# Patient Record
Sex: MALE
[De-identification: names, ages, dates, MRNs, and addresses within clinical notes are randomized per-mention and may not be internally consistent; named-entity substitution may affect disease eponyms.]

---

## 2018-09-07 PROBLEM — Z00.00 ENCOUNTER FOR PREVENTIVE HEALTH EXAMINATION: Status: ACTIVE | Noted: 2018-09-07

## 2018-09-13 ENCOUNTER — APPOINTMENT (OUTPATIENT)
Dept: RHEUMATOLOGY | Facility: CLINIC | Age: 48
End: 2018-09-13

## 2019-05-01 ENCOUNTER — APPOINTMENT (OUTPATIENT)
Dept: RHEUMATOLOGY | Facility: CLINIC | Age: 49
End: 2019-05-01
Payer: COMMERCIAL

## 2019-05-01 ENCOUNTER — LABORATORY RESULT (OUTPATIENT)
Age: 49
End: 2019-05-01

## 2019-05-01 VITALS
DIASTOLIC BLOOD PRESSURE: 83 MMHG | OXYGEN SATURATION: 96 % | RESPIRATION RATE: 16 BRPM | HEART RATE: 77 BPM | HEIGHT: 68 IN | SYSTOLIC BLOOD PRESSURE: 131 MMHG | WEIGHT: 192 LBS | BODY MASS INDEX: 29.1 KG/M2 | TEMPERATURE: 97.5 F

## 2019-05-01 DIAGNOSIS — Z82.49 FAMILY HISTORY OF ISCHEMIC HEART DISEASE AND OTHER DISEASES OF THE CIRCULATORY SYSTEM: ICD-10-CM

## 2019-05-01 DIAGNOSIS — Z78.9 OTHER SPECIFIED HEALTH STATUS: ICD-10-CM

## 2019-05-01 DIAGNOSIS — Z83.3 FAMILY HISTORY OF DIABETES MELLITUS: ICD-10-CM

## 2019-05-01 PROCEDURE — 99205 OFFICE O/P NEW HI 60 MIN: CPT

## 2019-05-02 LAB
CCP AB SER IA-ACNC: <8 UNITS
CK SERPL-CCNC: 1696 U/L
CRP SERPL-MCNC: 0.16 MG/DL
DSDNA AB SER-ACNC: <12 IU/ML
ENA RNP AB SER IA-ACNC: <0.2 AL
ENA SM AB SER IA-ACNC: <0.2 AL
ENA SS-A AB SER IA-ACNC: >8 AL
ENA SS-B AB SER IA-ACNC: <0.2 AL
ERYTHROCYTE [SEDIMENTATION RATE] IN BLOOD BY WESTERGREN METHOD: 13 MM/HR
HAV IGM SER QL: NONREACTIVE
HBV CORE IGM SER QL: NONREACTIVE
HBV SURFACE AG SER QL: NONREACTIVE
HCV AB SER QL: NONREACTIVE
HCV S/CO RATIO: 0.14 S/CO
RF+CCP IGG SER-IMP: NEGATIVE
RHEUMATOID FACT SER QL: 41 IU/ML
TSH SERPL-ACNC: 3.24 UIU/ML
URATE SERPL-MCNC: 7.3 MG/DL

## 2019-05-03 PROBLEM — Z82.49 FAMILY HISTORY OF HYPERTENSION: Status: ACTIVE | Noted: 2019-05-01

## 2019-05-03 PROBLEM — Z78.9 SOCIAL ALCOHOL USE: Status: ACTIVE | Noted: 2019-05-01

## 2019-05-03 PROBLEM — Z78.9 NON-SMOKER: Status: ACTIVE | Noted: 2019-05-01

## 2019-05-03 PROBLEM — Z83.3 FAMILY HISTORY OF DIABETES MELLITUS: Status: ACTIVE | Noted: 2019-05-01

## 2019-05-03 LAB
25(OH)D3 SERPL-MCNC: 22.9 NG/ML
ANA PAT FLD IF-IMP: ABNORMAL
ANA SER IF-ACNC: ABNORMAL
SMOOTH MUSCLE AB SER QL IF: NORMAL

## 2019-05-03 NOTE — DATA REVIEWED
[FreeTextEntry1] : Blood tests reviewed from March 2019:\par Creatinine 0.81\par AST of 55 normal limits between 10/35\par ALT 59  nL 9/46\par White count 5.5\par Hemoglobin 15.7\par Platelet count 227\par A 2-D echocardiogram from March 2019:\par LV and normal size and function with an LVEF of 55% \par All 4 valves normal.\par Carotid Doppler [id number]\par No stenosis or evidence of tortuosity of the vessels\par Ultrasound 2019 of March:\par Cholesterol stone or polyp of the gallbladder with right renal cyst

## 2019-05-03 NOTE — ASSESSMENT
[FreeTextEntry1] : Patient with rash , myalgias after HCQ use:\par \par Previous testing from outside providers reviewed with patient in full.  Patient to have inflammatory markers and serologies drawn to assess for an underlying inflammatory process lending to presentation in the setting of myalgias and joint pain.  Hand films ordered.  Patient will benefit from physical therapy to help with joint mobility and muscle strengthening.  Quadriceps strengthening exercises demonstrated in the office.  Weight loss has been encouraged to reduce load over the medial joint line.  Viscosupplementation has been encouraged to provide additional lubrication and joint support.  In the interim, Diclofenac gel has been prescribed. Knee films ordered. \par He is in agreement with the above plan and will return in one months' time.\par \par \par \par

## 2019-05-03 NOTE — REVIEW OF SYSTEMS
[Arthralgias] : arthralgias [Skin Lesions] : skin lesion [Anxiety] : anxiety [Chills] : no chills [Eye Pain] : no eye pain [Fever] : no fever [Red Eyes] : eyes not red [Sore Throat] : no sore throat [Hoarseness] : no hoarseness [Leg Claudication] : no intermittent leg claudication [Lower Ext Edema] : no extremity edema [Cough] : no cough [SOB on Exertion] : no shortness of breath during exertion [Joint Pain] : no joint pain [Heartburn] : no heartburn [Joint Swelling] : no joint swelling [Joint Stiffness] : no joint stiffness [Limb Weakness] : no limb weakness [Depression] : no depression [Difficulty Walking] : no difficulty walking [Easy Bleeding] : no tendency for easy bleeding [Feelings Of Weakness] : no feelings of weakness [Muscle Weakness] : no muscle weakness [Easy Bruising] : no tendency for easy bruising

## 2019-05-03 NOTE — HISTORY OF PRESENT ILLNESS
[Skin Lesions] : skin lesions [Arthralgias] : arthralgias [Weight Loss] : no weight loss [FreeTextEntry1] : The patient presents with lower extremity weakness over the last few months. He explains having started Hydroxychloroquine at the recommendation by a prior rheumatologist based off of lab testing and symptoms including rash over the chest wall  triggered by sunlight.  He reports discontinuing HCQ secondary to weakness.  He explains intermittent knee pain without associated swelling, paresthesias or trauma.   He reports in general, feeling nervous.  He finds he feels well overall and does not understand the reasoning behind blood testing and medication use.    He otherwise denies Raynaud's or accompanied joint swelling. \par He further denies visual disturbances, oral ulcers, dyspnea, chest pain, motor or sensory disturbances or fevers. [Chills] : no chills [Malaise] : no malaise [Fever] : no fever [Oral Ulcers] : no oral ulcers [Cough] : no cough [Depression] : no depression [Chest Pain] : no chest pain [Dry Mouth] : no dry mouth [Shortness of Breath] : no shortness of breath [Joint Swelling] : no joint swelling [Joint Warmth] : no joint warmth [Decreased ROM] : no decreased range of motion [Joint Deformity] : no joint deformity [Morning Stiffness] : no morning stiffness [Difficulty Walking] : no difficulty walking [Difficulty Standing] : no difficulty standing [Falls] : no falls [Muscle Spasms] : no muscle spasms [Dyspnea] : no dyspnea [Myalgias] : no myalgias [Muscle Weakness] : no muscle weakness [Visual Changes] : no visual changes [Muscle Cramping] : no muscle cramping [Eye Pain] : no eye pain [Dry Eyes] : no dry eyes [Eye Redness] : no eye redness

## 2019-05-07 LAB
M TB IFN-G BLD-IMP: NEGATIVE
QUANTIFERON TB PLUS MITOGEN MINUS NIL: >10 IU/ML
QUANTIFERON TB PLUS NIL: 0.05 IU/ML
QUANTIFERON TB PLUS TB1 MINUS NIL: 0.01 IU/ML
QUANTIFERON TB PLUS TB2 MINUS NIL: 0 IU/ML

## 2019-05-08 ENCOUNTER — APPOINTMENT (OUTPATIENT)
Dept: RHEUMATOLOGY | Facility: CLINIC | Age: 49
End: 2019-05-08
Payer: COMMERCIAL

## 2019-05-08 VITALS
OXYGEN SATURATION: 97 % | RESPIRATION RATE: 16 BRPM | BODY MASS INDEX: 28.79 KG/M2 | TEMPERATURE: 98.6 F | DIASTOLIC BLOOD PRESSURE: 80 MMHG | HEIGHT: 68 IN | WEIGHT: 190 LBS | SYSTOLIC BLOOD PRESSURE: 126 MMHG | HEART RATE: 77 BPM

## 2019-05-08 PROCEDURE — 99214 OFFICE O/P EST MOD 30 MIN: CPT

## 2019-05-10 RX ORDER — HYDROXYCHLOROQUINE SULFATE 200 MG/1
200 TABLET, FILM COATED ORAL
Refills: 0 | Status: ACTIVE | COMMUNITY

## 2019-05-10 NOTE — HISTORY OF PRESENT ILLNESS
[FreeTextEntry1] : Patient returns for followup blood testing results reflecting LUCINA of 1:80 in a speckled pattern with Ro+ positivity. Patient with elevated CPKs as well. She reports sleep disturbance secondary to low mood and recent symptomatology. He explains continued myalgias and fatigue and denies prior statin or supplement use.  He otherwise denies any new systemic symptoms.

## 2019-05-10 NOTE — REVIEW OF SYSTEMS
[Arthralgias] : arthralgias [Skin Lesions] : skin lesion [Anxiety] : anxiety [Chills] : no chills [Fever] : no fever [Eye Pain] : no eye pain [Red Eyes] : eyes not red [Sore Throat] : no sore throat [Hoarseness] : no hoarseness [Leg Claudication] : no intermittent leg claudication [Lower Ext Edema] : no extremity edema [Cough] : no cough [SOB on Exertion] : no shortness of breath during exertion [Heartburn] : no heartburn [Joint Pain] : no joint pain [Joint Swelling] : no joint swelling [Joint Stiffness] : no joint stiffness [Limb Weakness] : no limb weakness [Difficulty Walking] : no difficulty walking [Depression] : no depression [Muscle Weakness] : no muscle weakness [Feelings Of Weakness] : no feelings of weakness [Easy Bleeding] : no tendency for easy bleeding [Easy Bruising] : no tendency for easy bruising

## 2019-05-10 NOTE — PHYSICAL EXAM
[General Appearance - Alert] : alert [Sclera] : the sclera and conjunctiva were normal [General Appearance - In No Acute Distress] : in no acute distress [Examination Of The Oral Cavity] : the lips and gums were normal [Oropharynx] : the oropharynx was normal [Neck Appearance] : the appearance of the neck was normal [] : the neck was supple [Auscultation Breath Sounds / Voice Sounds] : lungs were clear to auscultation bilaterally [Heart Rate And Rhythm] : heart rate was normal and rhythm regular [Edema] : there was no peripheral edema [Nail Clubbing] : no clubbing  or cyanosis of the fingernails [Abnormal Walk] : normal gait [No Spinal Tenderness] : no spinal tenderness [Musculoskeletal - Swelling] : no joint swelling seen [Motor Tone] : muscle strength and tone were normal [Motor Exam] : the motor exam was normal [Impaired Insight] : insight and judgment were intact [Oriented To Time, Place, And Person] : oriented to person, place, and time [FreeTextEntry1] : Quarter size erythematous macule over the left chest wall

## 2019-05-10 NOTE — PHYSICAL EXAM
[General Appearance - Alert] : alert [General Appearance - In No Acute Distress] : in no acute distress [Sclera] : the sclera and conjunctiva were normal [Examination Of The Oral Cavity] : the lips and gums were normal [Oropharynx] : the oropharynx was normal [Neck Appearance] : the appearance of the neck was normal [] : the neck was supple [Auscultation Breath Sounds / Voice Sounds] : lungs were clear to auscultation bilaterally [Heart Rate And Rhythm] : heart rate was normal and rhythm regular [Edema] : there was no peripheral edema [Abnormal Walk] : normal gait [No Spinal Tenderness] : no spinal tenderness [Nail Clubbing] : no clubbing  or cyanosis of the fingernails [Musculoskeletal - Swelling] : no joint swelling seen [Motor Tone] : muscle strength and tone were normal [Motor Exam] : the motor exam was normal [Oriented To Time, Place, And Person] : oriented to person, place, and time [Impaired Insight] : insight and judgment were intact [FreeTextEntry1] : Quarter size erythematous macule over the left chest wall

## 2019-05-10 NOTE — ASSESSMENT
[FreeTextEntry1] : Patient with CTD/Overlap syndrome LUCINA + Ro+ +CPK:\par \par Will initiate disease modifying agent at this time to help prevent further progression of symptomatology.  Patient understands the increase risk of  infections as well as the increase risk of bone marrow, liver and lung toxicity associated with MTX.  The patient understands the importance of monitoring for drug toxicities every three months. In the interim steroid therapy given to help assist with myalgias and fatigue as reflected by  elevated CPKs.  He is aware of the harmful long term effects of chronic steroid use, including the development of DM, HTN, Osteoporosis and ocular defects including Cataracts and Glaucoma.\par Patient understands the increased risk of cardiovascular events related to CTD.  He understands the importance of sun avoidance and the application of SPF protection as well as the use of wide brim hats.\par Patient will benefit from physical therapy to help with joint mobility and muscle strengthening.  Quadriceps strengthening exercises rec. Weight loss has been encouraged to reduce load over the medial joint line.  Viscosupplementation has been encouraged to provide additional lubrication and joint support. \par \par He is in agreement with the above plan and will return in one months' time.\par \par \par \par

## 2019-06-13 LAB — MYOMARKER PANEL 1: NORMAL

## 2019-06-18 LAB
ALBUMIN SERPL ELPH-MCNC: 4.4 G/DL
ALP BLD-CCNC: 45 U/L
ALT SERPL-CCNC: 55 U/L
ANION GAP SERPL CALC-SCNC: 11 MMOL/L
AST SERPL-CCNC: 54 U/L
BASOPHILS # BLD AUTO: 0.04 K/UL
BASOPHILS NFR BLD AUTO: 0.6 %
BILIRUB SERPL-MCNC: 1 MG/DL
BUN SERPL-MCNC: 17 MG/DL
CALCIUM SERPL-MCNC: 9.9 MG/DL
CHLORIDE SERPL-SCNC: 103 MMOL/L
CK SERPL-CCNC: 1161 U/L
CO2 SERPL-SCNC: 28 MMOL/L
CREAT SERPL-MCNC: 0.82 MG/DL
CRP SERPL-MCNC: <0.1 MG/DL
EOSINOPHIL # BLD AUTO: 0.16 K/UL
EOSINOPHIL NFR BLD AUTO: 2.2 %
ERYTHROCYTE [SEDIMENTATION RATE] IN BLOOD BY WESTERGREN METHOD: 7 MM/HR
GLUCOSE SERPL-MCNC: 106 MG/DL
HCT VFR BLD CALC: 45.8 %
HGB BLD-MCNC: 15 G/DL
IMM GRANULOCYTES NFR BLD AUTO: 0.3 %
LDH SERPL-CCNC: 325 U/L
LYMPHOCYTES # BLD AUTO: 3.25 K/UL
LYMPHOCYTES NFR BLD AUTO: 44.7 %
MAN DIFF?: NORMAL
MCHC RBC-ENTMCNC: 30.9 PG
MCHC RBC-ENTMCNC: 32.8 GM/DL
MCV RBC AUTO: 94.2 FL
MONOCYTES # BLD AUTO: 0.61 K/UL
MONOCYTES NFR BLD AUTO: 8.4 %
NEUTROPHILS # BLD AUTO: 3.19 K/UL
NEUTROPHILS NFR BLD AUTO: 43.8 %
PLATELET # BLD AUTO: 200 K/UL
POTASSIUM SERPL-SCNC: 4.9 MMOL/L
PROT SERPL-MCNC: 6.9 G/DL
RBC # BLD: 4.86 M/UL
RBC # FLD: 13.2 %
SODIUM SERPL-SCNC: 142 MMOL/L
WBC # FLD AUTO: 7.27 K/UL

## 2019-06-19 ENCOUNTER — APPOINTMENT (OUTPATIENT)
Dept: RHEUMATOLOGY | Facility: CLINIC | Age: 49
End: 2019-06-19
Payer: COMMERCIAL

## 2019-06-19 VITALS
SYSTOLIC BLOOD PRESSURE: 133 MMHG | OXYGEN SATURATION: 96 % | WEIGHT: 190 LBS | RESPIRATION RATE: 16 BRPM | DIASTOLIC BLOOD PRESSURE: 85 MMHG | HEIGHT: 68 IN | TEMPERATURE: 98 F | HEART RATE: 76 BPM | BODY MASS INDEX: 28.79 KG/M2

## 2019-06-19 DIAGNOSIS — M25.561 PAIN IN RIGHT KNEE: ICD-10-CM

## 2019-06-19 DIAGNOSIS — R21 RASH AND OTHER NONSPECIFIC SKIN ERUPTION: ICD-10-CM

## 2019-06-19 DIAGNOSIS — M25.562 PAIN IN RIGHT KNEE: ICD-10-CM

## 2019-06-19 DIAGNOSIS — Z78.9 OTHER SPECIFIED HEALTH STATUS: ICD-10-CM

## 2019-06-19 DIAGNOSIS — R53.83 OTHER FATIGUE: ICD-10-CM

## 2019-06-19 DIAGNOSIS — M35.8 OTHER SPECIFIED SYSTEMIC INVOLVEMENT OF CONNECTIVE TISSUE: ICD-10-CM

## 2019-06-19 PROCEDURE — 99214 OFFICE O/P EST MOD 30 MIN: CPT

## 2019-06-19 RX ORDER — METHOTREXATE 2.5 MG/1
2.5 TABLET ORAL
Qty: 24 | Refills: 2 | Status: ACTIVE | COMMUNITY
Start: 2019-05-08 | End: 1900-01-01

## 2019-06-19 RX ORDER — FOLIC ACID 1 MG/1
1 TABLET ORAL DAILY
Qty: 90 | Refills: 3 | Status: ACTIVE | COMMUNITY
Start: 2019-05-08 | End: 1900-01-01

## 2019-06-19 RX ORDER — PREDNISONE 10 MG/1
10 TABLET ORAL
Qty: 45 | Refills: 2 | Status: ACTIVE | COMMUNITY
Start: 2019-05-08 | End: 1900-01-01

## 2019-06-19 NOTE — REVIEW OF SYSTEMS
[Arthralgias] : arthralgias [Skin Lesions] : skin lesion [Anxiety] : anxiety [Fever] : no fever [Chills] : no chills [Eye Pain] : no eye pain [Red Eyes] : eyes not red [Sore Throat] : no sore throat [Hoarseness] : no hoarseness [Leg Claudication] : no intermittent leg claudication [Lower Ext Edema] : no extremity edema [Cough] : no cough [SOB on Exertion] : no shortness of breath during exertion [Heartburn] : no heartburn [Joint Pain] : no joint pain [Joint Stiffness] : no joint stiffness [Joint Swelling] : no joint swelling [Limb Weakness] : no limb weakness [Difficulty Walking] : no difficulty walking [Muscle Weakness] : no muscle weakness [Depression] : no depression [Easy Bleeding] : no tendency for easy bleeding [Feelings Of Weakness] : no feelings of weakness [Easy Bruising] : no tendency for easy bruising

## 2019-06-19 NOTE — HISTORY OF PRESENT ILLNESS
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 Patient reports improved fatigue and energy levels however continues with intermittent myalgias. Recent CPK levels tapered to 1100 with a mild elevation of LDH. Inflammatory markers are within normal limits. LFTs continued to remain mildly elevated; Myositis panel negative.  Patient reports increased photosensitivity recently over the weekend with rash developing over the abdomen after assuming excursion. He reports experiencing rashes after each sun exposure generally, and does not apply sunscreen on a daily basis. He continues to tolerate MTX in combination with steroid therapy. He otherwise denies new systemic symptoms. He otherwise denies visual disturbances, oral ulcers, shortness of breath, chest pain, motor/sensory disturbances, Raynauds or fever.\par

## 2019-06-19 NOTE — PHYSICAL EXAM
[General Appearance - Alert] : alert [Sclera] : the sclera and conjunctiva were normal [General Appearance - In No Acute Distress] : in no acute distress [Oropharynx] : the oropharynx was normal [Neck Appearance] : the appearance of the neck was normal [Examination Of The Oral Cavity] : the lips and gums were normal [Auscultation Breath Sounds / Voice Sounds] : lungs were clear to auscultation bilaterally [] : the neck was supple [No Spinal Tenderness] : no spinal tenderness [Edema] : there was no peripheral edema [Heart Rate And Rhythm] : heart rate was normal and rhythm regular [Nail Clubbing] : no clubbing  or cyanosis of the fingernails [Abnormal Walk] : normal gait [Motor Tone] : muscle strength and tone were normal [Musculoskeletal - Swelling] : no joint swelling seen [Motor Exam] : the motor exam was normal [Sensation] : the sensory exam was normal to light touch and pinprick [Oriented To Time, Place, And Person] : oriented to person, place, and time [Impaired Insight] : insight and judgment were intact [FreeTextEntry1] : Erythematous macules over the abdominal wall bilaterally

## 2019-06-19 NOTE — ASSESSMENT
[FreeTextEntry1] : Patient with CTD/Overlap syndrome LUCINA + Ro+ +CPK:\par \par Patient to continue with methotrexate use titrated to 6 pills q. week we. In conjunction he will take folic acid vitamin daily to help praful accompanying side effects of MTX including bone marrow suppression and oral ulcers along with liver and lung toxicity,  ; he understands the importance of alcohol abstinence secondary to hepatotoxicity effects of methotrexate as well as mild transaminitis seen secondary to inflammatory disease.  The patient understands the importance of monitoring for drug toxicities every three months. Steroid therapy to be tapered to one tablet daily.   He is aware of the harmful long term effects of chronic steroid use, including the development of DM, HTN, Osteoporosis and ocular defects including Cataracts and Glaucoma.\par Patient understands the increased risk of cardiovascular events related to CTD.  He understands the importance of sun avoidance and the application of SPF protection as well as the use of wide brim hats.\par \par Patient will benefit from physical therapy to help with joint mobility and muscle strengthening.  Quadriceps strengthening exercises rec. Weight loss has been encouraged to reduce load over the medial joint line.  Viscosupplementation has been encouraged to provide additional lubrication and joint support. \par \par He is in agreement with the above plan and will return in three months' time.\par \par \par \par

## 2019-10-03 ENCOUNTER — APPOINTMENT (OUTPATIENT)
Dept: RHEUMATOLOGY | Facility: CLINIC | Age: 49
End: 2019-10-03

## 2023-11-01 ENCOUNTER — NON-APPOINTMENT (OUTPATIENT)
Age: 53
End: 2023-11-01

## 2023-12-18 ENCOUNTER — NON-APPOINTMENT (OUTPATIENT)
Age: 53
End: 2023-12-18